# Patient Record
Sex: MALE | Race: WHITE | URBAN - METROPOLITAN AREA
[De-identification: names, ages, dates, MRNs, and addresses within clinical notes are randomized per-mention and may not be internally consistent; named-entity substitution may affect disease eponyms.]

---

## 2024-01-11 ENCOUNTER — OFFICE VISIT (OUTPATIENT)
Dept: URBAN - METROPOLITAN AREA CLINIC 10 | Facility: CLINIC | Age: 39
End: 2024-01-11
Payer: MEDICARE

## 2024-01-11 DIAGNOSIS — H52.13 MYOPIA, BILATERAL: ICD-10-CM

## 2024-01-11 DIAGNOSIS — H04.123 TEAR FILM INSUFFICIENCY OF BILATERAL LACRIMAL GLANDS: Primary | ICD-10-CM

## 2024-01-11 PROCEDURE — 92134 CPTRZ OPH DX IMG PST SGM RTA: CPT | Performed by: OPTOMETRIST

## 2024-01-11 PROCEDURE — 99204 OFFICE O/P NEW MOD 45 MIN: CPT | Performed by: OPTOMETRIST

## 2024-01-11 ASSESSMENT — INTRAOCULAR PRESSURE
OS: 18
OD: 16

## 2024-01-11 ASSESSMENT — VISUAL ACUITY
OS: 20/30
OD: 20/20

## 2024-06-27 ENCOUNTER — APPOINTMENT (RX ONLY)
Dept: URBAN - METROPOLITAN AREA CLINIC 319 | Facility: CLINIC | Age: 39
Setting detail: DERMATOLOGY
End: 2024-06-27

## 2024-06-27 DIAGNOSIS — B07.0 PLANTAR WART: ICD-10-CM

## 2024-06-27 DIAGNOSIS — L72.8 OTHER FOLLICULAR CYSTS OF THE SKIN AND SUBCUTANEOUS TISSUE: ICD-10-CM

## 2024-06-27 PROCEDURE — ? FULL BODY SKIN EXAM - DECLINED

## 2024-06-27 PROCEDURE — ? ADDITIONAL NOTES

## 2024-06-27 PROCEDURE — ? TREATMENT REGIMEN

## 2024-06-27 PROCEDURE — ? COUNSELING

## 2024-06-27 PROCEDURE — 11900 INJECT SKIN LESIONS </W 7: CPT

## 2024-06-27 PROCEDURE — 99203 OFFICE O/P NEW LOW 30 MIN: CPT | Mod: 25

## 2024-06-27 PROCEDURE — ? INTRALESIONAL KENALOG

## 2024-06-27 ASSESSMENT — LOCATION ZONE DERM
LOCATION ZONE: TRUNK
LOCATION ZONE: FEET

## 2024-06-27 ASSESSMENT — LOCATION DETAILED DESCRIPTION DERM
LOCATION DETAILED: RIGHT BUTTOCK
LOCATION DETAILED: RIGHT PLANTAR FOREFOOT OVERLYING 5TH METATARSAL

## 2024-06-27 ASSESSMENT — LOCATION SIMPLE DESCRIPTION DERM
LOCATION SIMPLE: RIGHT BUTTOCK
LOCATION SIMPLE: RIGHT PLANTAR SURFACE

## 2024-06-27 NOTE — HPI: SKIN LESION
What Type Of Note Output Would You Prefer (Optional)?: Standard Output
Has Your Skin Lesion Been Treated?: not been treated
Is This A New Presentation, Or A Follow-Up?: Skin Lesions
Additional History: Pt notes the spot on the foot has been LN2 x2. The spot on the buttock he was prescribed an oral abx.

## 2024-06-27 NOTE — PROCEDURE: ADDITIONAL NOTES
Render Risk Assessment In Note?: no
Additional Notes: Discussed for definitive removal, would need surgical excision through general surgery.\\n\\nDiscussed intralesional kenalog may help shrink it and help the pain he is experiencing. Discussed risk of atrophy. Patient wished to try the ILK first and if continues to be bothersome, then will consider excision.
Detail Level: Simple

## 2024-06-27 NOTE — PROCEDURE: TREATMENT REGIMEN
Detail Level: Simple
Plan: Last LN2 two weeks ago from his PCP. Discussed I typically recommend LN2 spaced monthly until resolves and we could continue to management of his warts. He prefers to continue to see PCP for the LN2.

## 2024-06-27 NOTE — PROCEDURE: INTRALESIONAL KENALOG
Bill For Wasted Drug (Kenalog)?: no
Consent: The risks of atrophy were reviewed with the patient.
X Size Of Lesion In Cm (Optional): 0
Which Kenalog Vial Was Used?: Kenalog 10 mg/ml (5 ml vial)
Kenalog Preparation: Kenalog
Treatment Number (Optional): 1
Total Volume (Ccs): 0.5
Expiration Date For Kenalog (Optional): 02/2026
Administered By (Optional): Dr Mckeon
Lot # For Kenalog (Optional): [id number]
Detail Level: Detailed
Ndc# For Kenalog Only: 5808-2926-48
Kenalog Type Of Vial: Multiple Dose
Medical Necessity Clause: This procedure was medically necessary because the lesions that were treated were:
Validate Note Data When Using Inventory: Yes
Concentration Of Kenalog Solution Injected (Mg/Ml): 5.0